# Patient Record
Sex: FEMALE | Race: ASIAN
[De-identification: names, ages, dates, MRNs, and addresses within clinical notes are randomized per-mention and may not be internally consistent; named-entity substitution may affect disease eponyms.]

---

## 2017-12-24 ENCOUNTER — HOSPITAL ENCOUNTER (EMERGENCY)
Dept: HOSPITAL 76 - ED | Age: 52
Discharge: HOME | End: 2017-12-24
Payer: COMMERCIAL

## 2017-12-24 VITALS — SYSTOLIC BLOOD PRESSURE: 115 MMHG | DIASTOLIC BLOOD PRESSURE: 75 MMHG
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
## 2017-12-24 DIAGNOSIS — S60.221A: Primary | ICD-10-CM

## 2017-12-24 DIAGNOSIS — S40.011A: ICD-10-CM

## 2017-12-24 DIAGNOSIS — F17.200: ICD-10-CM

## 2017-12-24 PROCEDURE — 99283 EMERGENCY DEPT VISIT LOW MDM: CPT

## 2017-12-24 PROCEDURE — 73130 X-RAY EXAM OF HAND: CPT

## 2017-12-24 PROCEDURE — 73030 X-RAY EXAM OF SHOULDER: CPT

## 2017-12-24 NOTE — XRAY PRELIMINARY REPORT
Accession: [id number]

Exam: XR HAND 3 VIEW RT

 

IMPRESSION: Normal hand radiography.

 

[facility]

 

SITE ID: [id number]
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
## 2017-12-24 NOTE — XRAY PRELIMINARY REPORT
Accession: [id number]

Exam: XR SHOULDER 3 VIEW RT

 

IMPRESSION: No fracture or subluxation.

 

[facility]

 

SITE ID: [id number]
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
## 2021-10-19 ENCOUNTER — HOSPITAL ENCOUNTER (OUTPATIENT)
Dept: HOSPITAL 76 - COV | Age: 56
Discharge: HOME | End: 2021-10-19
Attending: FAMILY MEDICINE
Payer: COMMERCIAL

## 2021-10-19 DIAGNOSIS — R07.0: ICD-10-CM

## 2021-10-19 DIAGNOSIS — Z20.822: ICD-10-CM

## 2021-10-19 DIAGNOSIS — R05.9: Primary | ICD-10-CM

## 2021-10-19 DIAGNOSIS — R11.2: ICD-10-CM

## 2022-11-30 ENCOUNTER — HOSPITAL ENCOUNTER (OUTPATIENT)
Dept: HOSPITAL 76 - DI.N | Age: 57
Discharge: HOME | End: 2022-11-30
Attending: NURSE PRACTITIONER
Payer: COMMERCIAL

## 2022-11-30 DIAGNOSIS — R76.11: Primary | ICD-10-CM

## 2022-12-01 NOTE — XRAY REPORT
PROCEDURE:  Chest 2 View X-Ray

 

INDICATIONS:  POSITIVE QUANTIFERON GOLD TEST

 

TECHNIQUE:  2 views of the chest were acquired.  

 

COMPARISON:  None.

 

FINDINGS:  

 

Surgical changes and devices:  None.  

 

Lungs and pleura:  No pleural effusions or pneumothorax.  Lungs are clear.  

 

Mediastinum:  Mediastinal contours are normal.  Heart size is normal.  

 

Bones and chest wall:  No suspicious bony abnormalities.  Soft tissues appear unremarkable.  

 

IMPRESSION:  

No acute abnormality or plain radiographic evidence of pulmonary tuberculosis.

 

Reviewed by: Jamel Daniel MD on 12/1/2022 3:02 PM PST

Approved by: Jamel Daniel MD on 12/1/2022 3:02 PM PST

 

 

Station ID:  [id number]

## 2023-03-21 ENCOUNTER — HOSPITAL ENCOUNTER (OUTPATIENT)
Dept: HOSPITAL 76 - LAB.N | Age: 58
Discharge: HOME | End: 2023-03-21
Attending: NURSE PRACTITIONER
Payer: COMMERCIAL

## 2023-03-21 DIAGNOSIS — Z00.00: Primary | ICD-10-CM

## 2023-03-21 DIAGNOSIS — Z51.81: ICD-10-CM

## 2023-03-21 LAB
CHOLEST SERPL-MCNC: 189 MG/DL
GLUCOSE SERPL-MCNC: 99 MG/DL (ref 70–100)
HDLC SERPL-MCNC: 105 MG/DL
HDLC SERPL: 1.8 {RATIO} (ref ?–4.4)
TRIGL P FAST SERPL-MCNC: 33 MG/DL

## 2023-03-21 PROCEDURE — 80061 LIPID PANEL: CPT

## 2023-03-21 PROCEDURE — 82947 ASSAY GLUCOSE BLOOD QUANT: CPT

## 2023-03-21 PROCEDURE — 83721 ASSAY OF BLOOD LIPOPROTEIN: CPT

## 2023-03-21 PROCEDURE — 36415 COLL VENOUS BLD VENIPUNCTURE: CPT
